# Patient Record
Sex: MALE | Race: BLACK OR AFRICAN AMERICAN | NOT HISPANIC OR LATINO | Employment: OTHER | URBAN - METROPOLITAN AREA
[De-identification: names, ages, dates, MRNs, and addresses within clinical notes are randomized per-mention and may not be internally consistent; named-entity substitution may affect disease eponyms.]

---

## 2020-12-11 PROBLEM — D18.09: Status: ACTIVE | Noted: 2020-12-11

## 2020-12-12 PROBLEM — C31.9: Status: ACTIVE | Noted: 2020-12-11

## 2020-12-12 PROBLEM — J39.2 NASOPHARYNGEAL MASS: Status: RESOLVED | Noted: 2020-12-12 | Resolved: 2020-12-12

## 2020-12-12 PROBLEM — J39.2 NASOPHARYNGEAL MASS: Status: ACTIVE | Noted: 2020-12-12

## 2020-12-12 PROBLEM — C79.31: Status: ACTIVE | Noted: 2020-12-11

## 2020-12-14 PROBLEM — E11.9 DIABETES MELLITUS WITHOUT COMPLICATION: Status: ACTIVE | Noted: 2020-12-14

## 2020-12-14 PROBLEM — I10 ESSENTIAL HYPERTENSION: Status: ACTIVE | Noted: 2020-12-14

## 2020-12-14 PROBLEM — Z86.73 HISTORY OF CVA (CEREBROVASCULAR ACCIDENT): Status: ACTIVE | Noted: 2020-12-14

## 2020-12-15 PROBLEM — G81.10 SPASTIC HEMIPLEGIA AFFECTING DOMINANT SIDE: Status: ACTIVE | Noted: 2020-12-15

## 2020-12-15 PROBLEM — G81.91 RIGHT HEMIPARESIS: Status: ACTIVE | Noted: 2020-12-15

## 2020-12-15 PROBLEM — M47.817 LUMBOSACRAL SPONDYLOSIS WITHOUT MYELOPATHY: Status: ACTIVE | Noted: 2020-12-15

## 2020-12-15 PROBLEM — I69.959 HEMIPLEGIA OF DOMINANT SIDE AS LATE EFFECT OF CEREBROVASCULAR DISEASE: Status: ACTIVE | Noted: 2020-12-15

## 2020-12-15 PROBLEM — I67.2 CEREBRAL ATHEROSCLEROSIS: Status: ACTIVE | Noted: 2020-12-15

## 2020-12-15 PROBLEM — E11.65 TYPE 2 DIABETES MELLITUS WITH HYPERGLYCEMIA, WITHOUT LONG-TERM CURRENT USE OF INSULIN: Status: ACTIVE | Noted: 2020-12-14

## 2020-12-16 PROBLEM — E66.3 OVERWEIGHT (BMI 25.0-29.9): Status: ACTIVE | Noted: 2020-12-16

## 2020-12-16 PROBLEM — Z79.4 TYPE 2 DIABETES MELLITUS WITH HYPERGLYCEMIA, WITH LONG-TERM CURRENT USE OF INSULIN: Status: ACTIVE | Noted: 2020-12-14

## 2020-12-18 PROBLEM — D72.823 LEUKEMOID REACTION: Status: ACTIVE | Noted: 2020-12-18

## 2020-12-19 PROBLEM — E88.09 HYPOALBUMINEMIA: Status: ACTIVE | Noted: 2020-12-19

## 2020-12-28 ENCOUNTER — TELEPHONE (OUTPATIENT)
Dept: ADMINISTRATIVE | Facility: CLINIC | Age: 55
End: 2020-12-28

## 2020-12-29 PROBLEM — D72.823 LEUKEMOID REACTION: Status: RESOLVED | Noted: 2020-12-18 | Resolved: 2020-12-29

## 2021-04-21 PROBLEM — H54.61 VISION LOSS OF RIGHT EYE: Status: ACTIVE | Noted: 2021-04-21

## 2021-04-29 PROBLEM — E11.9 DIABETES MELLITUS WITHOUT COMPLICATION: Status: ACTIVE | Noted: 2021-04-29

## 2021-04-29 PROBLEM — H47.20 OPTIC ATROPHY OF BOTH EYES: Status: ACTIVE | Noted: 2021-04-29

## 2021-04-29 PROBLEM — D49.6 BRAIN TUMOR: Status: ACTIVE | Noted: 2021-04-29

## 2021-05-08 ENCOUNTER — NURSE TRIAGE (OUTPATIENT)
Dept: ADMINISTRATIVE | Facility: CLINIC | Age: 56
End: 2021-05-08

## 2021-05-10 ENCOUNTER — NURSE TRIAGE (OUTPATIENT)
Dept: ADMINISTRATIVE | Facility: CLINIC | Age: 56
End: 2021-05-10

## 2021-05-12 ENCOUNTER — NURSE TRIAGE (OUTPATIENT)
Dept: ADMINISTRATIVE | Facility: CLINIC | Age: 56
End: 2021-05-12

## 2021-05-24 PROBLEM — D49.6 BRAIN TUMOR: Status: RESOLVED | Noted: 2021-04-29 | Resolved: 2021-05-24

## 2023-01-26 PROBLEM — D49.89 NASAL TUMOR: Status: ACTIVE | Noted: 2023-01-26

## 2024-01-04 ENCOUNTER — NURSE TRIAGE (OUTPATIENT)
Dept: ADMINISTRATIVE | Facility: CLINIC | Age: 59
End: 2024-01-04
Payer: MEDICAID

## 2024-01-04 NOTE — TELEPHONE ENCOUNTER
Speaking with Juan Soto, states patient has a lot going on. He is in rehab now, confused. He does have appointment today to discuss his HEM/ONC medical findings. She states she has not heard back from providers re her questions. She would like call back for .  Apologized to called and advised I would send message to providers and ask for call back. Verb understanding.         Reason for Disposition   Nursing judgment    Protocols used: Information Only Call - No Triage-A-[place]

## 2024-01-11 PROBLEM — E66.9 OBESITY (BMI 30-39.9): Status: ACTIVE | Noted: 2024-01-11

## 2024-01-13 PROBLEM — N30.01 ACUTE CYSTITIS WITH HEMATURIA: Status: ACTIVE | Noted: 2024-01-13

## 2024-01-13 PROBLEM — D72.829 LEUKOCYTOSIS: Status: ACTIVE | Noted: 2024-01-13

## 2024-01-15 PROBLEM — E66.9 OBESITY (BMI 30-39.9): Status: RESOLVED | Noted: 2024-01-11 | Resolved: 2024-01-15

## 2024-01-15 PROBLEM — G91.9 HYDROCEPHALUS IN ADULT: Status: ACTIVE | Noted: 2024-01-15

## 2024-01-16 PROBLEM — C80.1: Status: ACTIVE | Noted: 2024-01-16

## 2024-01-16 PROBLEM — Z51.5 COMFORT MEASURES ONLY STATUS: Status: ACTIVE | Noted: 2024-01-16

## 2024-01-16 PROBLEM — Z66 DNR (DO NOT RESUSCITATE): Status: ACTIVE | Noted: 2024-01-16

## 2024-01-31 PROBLEM — C79.31: Status: ACTIVE | Noted: 2024-01-31

## 2024-01-31 PROBLEM — C31.9: Status: ACTIVE | Noted: 2024-01-31

## 2024-01-31 PROBLEM — I47.10 SUPRAVENTRICULAR TACHYCARDIA: Status: ACTIVE | Noted: 2024-01-31
